# Patient Record
Sex: FEMALE | Race: BLACK OR AFRICAN AMERICAN | Employment: PART TIME | URBAN - METROPOLITAN AREA
[De-identification: names, ages, dates, MRNs, and addresses within clinical notes are randomized per-mention and may not be internally consistent; named-entity substitution may affect disease eponyms.]

---

## 2017-01-07 ENCOUNTER — HOSPITAL ENCOUNTER (EMERGENCY)
Facility: HOSPITAL | Age: 32
Discharge: HOME | End: 2017-01-07
Attending: EMERGENCY MEDICINE

## 2017-01-07 VITALS
OXYGEN SATURATION: 100 % | HEART RATE: 82 BPM | SYSTOLIC BLOOD PRESSURE: 109 MMHG | BODY MASS INDEX: 25.11 KG/M2 | DIASTOLIC BLOOD PRESSURE: 69 MMHG | WEIGHT: 160 LBS | RESPIRATION RATE: 20 BRPM | HEIGHT: 67 IN

## 2017-01-07 DIAGNOSIS — E86.0 DEHYDRATION: ICD-10-CM

## 2017-01-07 DIAGNOSIS — R55 SYNCOPE AND COLLAPSE: Primary | ICD-10-CM

## 2017-01-07 LAB
ANION GAP SERPL CALC-SCNC: 12 MMOL/L (ref 0–18)
BASOPHILS # BLD: 0.1 K/UL (ref 0–0.2)
BASOPHILS NFR BLD: 1 %
BUN SERPL-MCNC: 11 MG/DL (ref 8–20)
BUN/CREAT SERPL: 13.1 (ref 10–20)
CALCIUM SERPL-MCNC: 8.4 MG/DL (ref 8.5–10.5)
CHLORIDE SERPL-SCNC: 104 MMOL/L (ref 95–110)
CO2 SERPL-SCNC: 26 MMOL/L (ref 22–32)
CREAT SERPL-MCNC: 0.84 MG/DL (ref 0.5–1.5)
EOSINOPHIL # BLD: 0.1 K/UL (ref 0–0.7)
EOSINOPHIL NFR BLD: 2 %
ERYTHROCYTE [DISTWIDTH] IN BLOOD BY AUTOMATED COUNT: 13.9 % (ref 11–15)
GLUCOSE SERPL-MCNC: 105 MG/DL (ref 70–99)
HCT VFR BLD AUTO: 42.2 % (ref 35–48)
HGB BLD-MCNC: 14.1 G/DL (ref 12–16)
LYMPHOCYTES # BLD: 3.1 K/UL (ref 1–4)
LYMPHOCYTES NFR BLD: 40 %
MCH RBC QN AUTO: 31 PG (ref 27–32)
MCHC RBC AUTO-ENTMCNC: 33.5 G/DL (ref 32–37)
MCV RBC AUTO: 92.6 FL (ref 80–100)
MONOCYTES # BLD: 0.7 K/UL (ref 0–1)
MONOCYTES NFR BLD: 9 %
NEUTROPHILS # BLD AUTO: 3.8 K/UL (ref 1.8–7.7)
NEUTROPHILS NFR BLD: 49 %
OSMOLALITY UR CALC.SUM OF ELEC: 294 MOSM/KG (ref 275–295)
PLATELET # BLD AUTO: 183 K/UL (ref 140–400)
PMV BLD AUTO: 9.8 FL (ref 7.4–10.3)
POTASSIUM SERPL-SCNC: 4.1 MMOL/L (ref 3.3–5.1)
RBC # BLD AUTO: 4.55 M/UL (ref 3.7–5.4)
SODIUM SERPL-SCNC: 142 MMOL/L (ref 136–144)
TROPONIN I SERPL-MCNC: 0 NG/ML (ref ?–0.03)
WBC # BLD AUTO: 7.8 K/UL (ref 4–11)

## 2017-01-07 PROCEDURE — 84484 ASSAY OF TROPONIN QUANT: CPT | Performed by: EMERGENCY MEDICINE

## 2017-01-07 PROCEDURE — 99284 EMERGENCY DEPT VISIT MOD MDM: CPT

## 2017-01-07 PROCEDURE — 36415 COLL VENOUS BLD VENIPUNCTURE: CPT

## 2017-01-07 PROCEDURE — 93010 ELECTROCARDIOGRAM REPORT: CPT | Performed by: EMERGENCY MEDICINE

## 2017-01-07 PROCEDURE — 85025 COMPLETE CBC W/AUTO DIFF WBC: CPT | Performed by: EMERGENCY MEDICINE

## 2017-01-07 PROCEDURE — 93005 ELECTROCARDIOGRAM TRACING: CPT

## 2017-01-07 PROCEDURE — 80048 BASIC METABOLIC PNL TOTAL CA: CPT | Performed by: EMERGENCY MEDICINE

## 2017-01-07 NOTE — ED NOTES
Pt resting comfortably without complaints. Pt given sandwich and juice with MD consent. BP noted; IV fluids still infusing.

## 2017-01-07 NOTE — ED PROVIDER NOTES
Patient Seen in: [facility] AND [facility] Emergency Department    History   Patient presents with:  Syncope (cardiovascular, neurologic)    Stated Complaint:     HPI    Patient is a 43-year-old female who presents to the emergency department with a chief compla cm (5' 7\")  Wt 72.576 kg  BMI 25.05 kg/m2  SpO2 100%  LMP 12/25/2016        Physical Exam   Constitutional: She is oriented to person, place, and time. She appears well-developed and well-nourished. HENT:   Head: Normocephalic and atraumatic.    Eyes: Co Impression:  Syncope and collapse  (primary encounter diagnosis)  Dehydration    Disposition:  Discharge    Follow-up:  Branden Dodd MD  [address] [place] [address] [phone]    Call  As needed      Medications Prescribed:  There

## 2019-09-03 ENCOUNTER — HOSPITAL ENCOUNTER (OUTPATIENT)
Dept: GENERAL RADIOLOGY | Facility: HOSPITAL | Age: 34
Discharge: HOME | End: 2019-09-03
Attending: PHYSICIAN ASSISTANT
Payer: MEDICAID

## 2019-09-03 DIAGNOSIS — M54.6 THORACIC SPINE PAIN: ICD-10-CM

## 2019-09-03 DIAGNOSIS — M54.50 LUMBAR PAIN: ICD-10-CM

## 2019-09-03 DIAGNOSIS — M54.2 CERVICAL PAIN: ICD-10-CM

## 2019-09-03 PROCEDURE — 72110 X-RAY EXAM L-2 SPINE 4/>VWS: CPT | Performed by: PHYSICIAN ASSISTANT

## 2019-09-03 PROCEDURE — 72050 X-RAY EXAM NECK SPINE 4/5VWS: CPT | Performed by: PHYSICIAN ASSISTANT

## 2019-09-03 PROCEDURE — 72072 X-RAY EXAM THORAC SPINE 3VWS: CPT | Performed by: PHYSICIAN ASSISTANT

## 2020-09-20 ENCOUNTER — HOSPITAL ENCOUNTER (EMERGENCY)
Facility: HOSPITAL | Age: 35
Discharge: HOME | End: 2020-09-20
Attending: EMERGENCY MEDICINE
Payer: MEDICAID

## 2020-09-20 VITALS
OXYGEN SATURATION: 100 % | SYSTOLIC BLOOD PRESSURE: 143 MMHG | BODY MASS INDEX: 29 KG/M2 | TEMPERATURE: 98 F | RESPIRATION RATE: 20 BRPM | HEART RATE: 71 BPM | DIASTOLIC BLOOD PRESSURE: 91 MMHG | WEIGHT: 187 LBS

## 2020-09-20 DIAGNOSIS — M43.6 TORTICOLLIS: Primary | ICD-10-CM

## 2020-09-20 PROCEDURE — 99283 EMERGENCY DEPT VISIT LOW MDM: CPT

## 2020-09-20 RX ORDER — DIAZEPAM 5 MG/1
5 TABLET ORAL 3 TIMES DAILY PRN
Qty: 20 TABLET | Refills: 0 | Status: SHIPPED | OUTPATIENT
Start: 2020-09-20 | End: 2020-09-27

## 2020-09-20 NOTE — ED PROVIDER NOTES
Patient Seen in: [facility] AND [facility] Emergency Department      History   Patient presents with:  Neck Pain    Stated Complaint: Neck Pain    HPI    19-year-old female presents for complaint of right-sided neck pain for the past 5 days.   Patient states symp General: She is not in acute distress. Appearance: Normal appearance. HENT:      Head: Normocephalic and atraumatic. Jaw: No trismus. Right Ear: Tympanic membrane normal. No mastoid tenderness.       Left Ear: Tympanic membrane normal. No MDM    Symptoms and exam consistent with cervical neck strain. She is started on a short course of some Valium. There is no evidence for any cardiac dissection. No evidence for any infectious process. She is neurovascularly intact.   Range of mo

## 2020-09-20 NOTE — ED INITIAL ASSESSMENT (HPI)
R side neck pain x 4-5 days. Worsening and now radiating to head.      Denies fevers, body aches, chills

## 2021-03-04 ENCOUNTER — HOSPITAL ENCOUNTER (EMERGENCY)
Facility: HOSPITAL | Age: 36
Discharge: HOME | End: 2021-03-04
Attending: EMERGENCY MEDICINE
Payer: MEDICAID

## 2021-03-04 VITALS
RESPIRATION RATE: 18 BRPM | WEIGHT: 185 LBS | BODY MASS INDEX: 30.82 KG/M2 | HEART RATE: 89 BPM | OXYGEN SATURATION: 100 % | TEMPERATURE: 99 F | SYSTOLIC BLOOD PRESSURE: 125 MMHG | DIASTOLIC BLOOD PRESSURE: 90 MMHG | HEIGHT: 65 IN

## 2021-03-04 DIAGNOSIS — M79.10 MYALGIA: Primary | ICD-10-CM

## 2021-03-04 PROCEDURE — 99282 EMERGENCY DEPT VISIT SF MDM: CPT

## 2021-03-04 RX ORDER — IBUPROFEN 400 MG/1
400 TABLET ORAL EVERY 8 HOURS PRN
Qty: 30 TABLET | Refills: 0 | Status: SHIPPED | OUTPATIENT
Start: 2021-03-04 | End: 2021-03-11

## 2021-03-04 RX ORDER — IBUPROFEN 400 MG/1
400 TABLET ORAL ONCE
Status: COMPLETED | OUTPATIENT
Start: 2021-03-04 | End: 2021-03-04

## 2021-03-04 NOTE — ED NOTES
Pt  has h/o scoliosis and arthritis.  has been having chronic back pain and now having lower body joint pain.  has tried taking tylenol (last week) and ibuprofen (2 wks ago), but has not helped.    took diazepam last night,  sh

## 2021-03-04 NOTE — ED INITIAL ASSESSMENT (HPI)
Patient aox3 to ed via private vehicle patient co of body pain x this am with nausea. Denies fever. Pain 5/10 worsens intermittently.  Hx of scoliosis and arthritis

## 2021-03-04 NOTE — ED PROVIDER NOTES
[facility] Emergency Department      History   Patient presents with:  Pain    Stated Complaint: Back pain, [place], joint pain burning feeling    HPI/Subjective:   HPI    58-year-old female presents for evaluation of generalized p 1116]   /90   Pulse 89   Resp 18   Temp 98.9 °F (37.2 °C)   Temp src Oral   SpO2 100 %   O2 Device None (Room air)       Current:/90   Pulse 89   Temp 98.9 °F (37.2 °C) (Oral)   Resp 18   Ht 165.1 cm (5' 5\")   Wt 83.9 kg   LMP 03/02/2021   SpO including evaluation for possible rheumatology etiology. Recommend physical therapy evaluation. Patient reports she does tolerate 400 mg ibuprofen, would like a prescription for this medication.   Discharged with recommendations for ibuprofen and Tylenol
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
## (undated) NOTE — ED AVS SNAPSHOT
[facility] Emergency Department    [address] [address]    Phone:  [phone]    Fax:  [phone]           Iam Deamalou   MRN: [id number]    Department:  [facility] Emergency Department   Date of Visit:  1/7 a substitute for ongoing medical care. Often, one Emergency Department visit does not uncover every injury or illness.  If you have been referred to a primary care or a specialist physician for a follow-up visit, please tell this physician (or your personal [address] [facility] [address] ([address]) [phone]   [facility] [address] [employer] ([address] [facility]) [address] ([place]) [phone]   Lavinia Adame [address] [employer]  [facility] & medical emergencies, dial 911.

## (undated) NOTE — ED AVS SNAPSHOT
[facility] Emergency Department    [address] [address]    Phone:  [phone]    Fax:  [phone]           Bernice Chowdhury   MRN: [id number]    Department:  [facility] Emergency Department   Date of Visit:  1/7 and Class Registration line at [phone] or find a doctor online by visiting [email or web address].    IF THERE IS ANY CHANGE OR WORSENING OF YOUR CONDITION, CALL YOUR PRIMARY CARE PHYSICIAN AT ONCE OR RETURN IMMEDIATELY TO [address] [place] [address].     If

## (undated) NOTE — LETTER
Date & Time: 3/4/2021, 12:09 PM  Patient: Spike Tran  Encounter Provider(s):    Wellington Dillon MD       To Whom It May Concern:    Spike Tran was seen and treated in our department on 3/4/2021.  She should not return to work until 3/6/202

## (undated) NOTE — LETTER
Date & Time: 9/20/2020, 1:11 PM  Patient: Samantha Bhakta  Encounter Provider(s):    Sukhi Bird MD       To Whom It May Concern:    Samantha Bhakta was seen and treated in our department on 9/20/2020.  She should not return to work until